# Patient Record
Sex: MALE | Race: WHITE | NOT HISPANIC OR LATINO | Employment: UNEMPLOYED | URBAN - METROPOLITAN AREA
[De-identification: names, ages, dates, MRNs, and addresses within clinical notes are randomized per-mention and may not be internally consistent; named-entity substitution may affect disease eponyms.]

---

## 2017-01-30 ENCOUNTER — HOSPITAL ENCOUNTER (EMERGENCY)
Facility: HOSPITAL | Age: 22
Discharge: HOME | End: 2017-01-30
Attending: EMERGENCY MEDICINE | Admitting: EMERGENCY MEDICINE
Payer: COMMERCIAL

## 2017-01-30 VITALS
HEART RATE: 55 BPM | OXYGEN SATURATION: 98 % | SYSTOLIC BLOOD PRESSURE: 158 MMHG | DIASTOLIC BLOOD PRESSURE: 74 MMHG | TEMPERATURE: 98.4 F | WEIGHT: 165 LBS | RESPIRATION RATE: 14 BRPM

## 2017-01-30 DIAGNOSIS — S06.0X9A CONCUSSION: Primary | ICD-10-CM

## 2017-01-30 PROCEDURE — 99283 EMERGENCY DEPT VISIT LOW MDM: CPT

## 2017-10-29 ENCOUNTER — HOSPITAL ENCOUNTER (EMERGENCY)
Facility: HOSPITAL | Age: 22
Discharge: HOME | End: 2017-10-30
Admitting: EMERGENCY MEDICINE
Payer: COMMERCIAL

## 2017-10-29 VITALS
OXYGEN SATURATION: 99 % | DIASTOLIC BLOOD PRESSURE: 86 MMHG | WEIGHT: 167.77 LBS | TEMPERATURE: 97.7 F | HEART RATE: 57 BPM | SYSTOLIC BLOOD PRESSURE: 163 MMHG | RESPIRATION RATE: 14 BRPM

## 2017-10-29 DIAGNOSIS — L24.9 IRRITANT CONTACT DERMATITIS, UNSPECIFIED TRIGGER: Primary | ICD-10-CM

## 2017-10-29 RX ORDER — DIAPER,BRIEF,INFANT-TODD,DISP
EACH MISCELLANEOUS 2 TIMES DAILY PRN
Status: DISCONTINUED | OUTPATIENT
Start: 2017-10-29 | End: 2017-10-30 | Stop reason: HOSPADM

## 2017-10-30 PROCEDURE — 99282 EMERGENCY DEPT VISIT SF MDM: CPT

## 2017-10-30 RX ADMIN — HYDROCORTISONE 1 APPLICATION: 1 CREAM TOPICAL at 00:22

## 2017-10-30 NOTE — ED PROVIDER NOTES
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 [place]  He states he was out jogging and dropped his keys and was looking for them in the weeds  He states that he then noticed that his hands were red, blistered and itching  He states he "freaked out" and came to the ED  He denies any known allergies  He states that his friends applied benadryl and the itching went away  Pt states he has anxiety and wanted the rash checked  History provided by:  Patient  Rash   Location:  Hand  Hand rash location:  L hand and R hand  Quality: blistering and itchiness    Severity:  Moderate  Onset quality:  Sudden  Progression:  Worsening  Chronicity:  New  Context: plant contact    Relieved by: Antihistamines  Worsened by:  Nothing      None       Past Medical History:   Diagnosis Date    Concussion        History reviewed  No pertinent surgical history  History reviewed  No pertinent family history  I have reviewed and agree with the history as documented  Social History   Substance Use Topics    Smoking status: Never Smoker    Smokeless tobacco: Never Used    Alcohol use No        Review of Systems   Constitutional: Negative  HENT: Negative  Eyes: Negative  Respiratory: Negative  Cardiovascular: Negative  Gastrointestinal: Negative  Endocrine: Negative  Genitourinary: Negative  Musculoskeletal: Negative  Skin: Positive for rash  Allergic/Immunologic: Negative  Neurological: Negative  Hematological: Negative  Psychiatric/Behavioral: Negative          Physical Exam  ED Triage Vitals [10/29/17 2341]   Temperature Pulse Respirations Blood Pressure SpO2   97 7 °F (36 5 °C) 57 14 163/86 99 %      Temp Source Heart Rate Source Patient Position - Orthostatic VS BP Location FiO2 (%)   Oral Monitor Sitting Right arm --      Pain Score       No Pain Orthostatic Vital Signs  Vitals:    10/29/17 2341   BP: 163/86   Pulse: 57   Patient Position - Orthostatic VS: Sitting       Physical Exam   Constitutional: He is oriented to person, place, and time  He appears well-developed and well-nourished  No distress  + anxiety    HENT:   Head: Normocephalic and atraumatic  Eyes: EOM are normal  Pupils are equal, round, and reactive to light  Neck: Normal range of motion  Neck supple  Cardiovascular: Normal rate, regular rhythm and normal heart sounds  Pulmonary/Chest: Effort normal and breath sounds normal    Abdominal: Soft  Bowel sounds are normal    Musculoskeletal: Normal range of motion  Neurological: He is alert and oriented to person, place, and time  Skin: Skin is warm  Capillary refill takes less than 2 seconds  He is not diaphoretic  Bilateral hands palmar and dorsal surface reveal macular red lesions  No blistering or open draining lesions noted  There are some linear lesions but pt states "his dog scratched him there"  Psychiatric: He has a normal mood and affect  His behavior is normal  Judgment and thought content normal    Nursing note and vitals reviewed        ED Medications  Medications   hydrocortisone 1 % cream (not administered)       Diagnostic Studies  Results Reviewed     None                 No orders to display              Procedures  Procedures       Phone Contacts  ED Phone Contact    ED Course  ED Course                                MDM  Number of Diagnoses or Management Options  Irritant contact dermatitis, unspecified trigger:   Diagnosis management comments: Contact dermatitis      Hydrocortisone cream 1%    CritCare Time    Disposition  Final diagnoses:   Irritant contact dermatitis, unspecified trigger     Time reflects when diagnosis was documented in both MDM as applicable and the Disposition within this note     Time User Action Codes Description Comment    10/29/2017 11:51 PM Sondra Shea Add [L24 9] Irritant contact dermatitis, unspecified trigger       ED Disposition     ED Disposition Condition Comment    Discharge  [facility] discharge to home/self care  Condition at discharge: Good        Follow-up Information     Follow up With Specialties Details Why Contact Info Additional Information    [address]  Schedule an appointment as soon as possible for a visit in 2 days  [address] [facility] Emergency Department Emergency Medicine  If symptoms worsen [address] [facility] ED, [address] [address]  , [facility], [place], [address]        Patient's Medications    No medications on file     No discharge procedures on file      ED Provider  Electronically Signed by           Ziyad Garcia  10/29/17 3515

## 2017-10-30 NOTE — DISCHARGE INSTRUCTIONS
You have been prescribed hydrocortisone cream to apply your hands when itching  You are to monitor what makes the rash worse or causes it to happen again  You may take benadryl orally if needed  Follow up with your PCP      Contact Dermatitis   WHAT YOU NEED TO KNOW:   Contact dermatitis is a skin rash  It develops when you touch something that irritates your skin or causes an allergic reaction  DISCHARGE INSTRUCTIONS:   Call 911 for any of the following:   · You have sudden trouble breathing  · Your throat swells and you have trouble eating  · Your face is swollen  Contact your healthcare provider if:   · You have a fever  · Your blisters are draining pus  · Your rash spreads or does not get better, even after treatment  · You have questions or concerns about your condition or care  Medicines:   · Medicines  help decrease itching and swelling  They will be given as a topical medicine to apply to your rash or as a pill  · Take your medicine as directed  Contact your healthcare provider if you think your medicine is not helping or if you have side effects  Tell him or her if you are allergic to any medicine  Keep a list of the medicines, vitamins, and herbs you take  Include the amounts, and when and why you take them  Bring the list or the pill bottles to follow-up visits  Carry your medicine list with you in case of an emergency  Manage contact dermatitis:   · Take short baths or showers in cool water  Use mild soap or soap-free cleansers  Add oatmeal, baking soda, or cornstarch to the bath water to help decrease skin irritation  · Avoid skin irritants , such as makeup, hair products, soaps, and cleansers  Use products that do not contain perfume or dye  · Apply a cool compress to your rash  This will help soothe your skin  · Keep your skin moist   Rub unscented cream or lotion on your skin to prevent dryness and itching   Do this right after a bath or shower when your skin is still damp  Follow up with your healthcare provider or dermatologist in 2 to 3 days:  Write down your questions so you remember to ask them during your visits  © 2017 [address] [employer] Information is for End User's use only and may not be sold, redistributed or otherwise used for commercial purposes  All illustrations and images included in CareNotes® are the copyrighted property of [employer]  or [employer]  The above information is an  only  It is not intended as medical advice for individual conditions or treatments  Talk to your doctor, nurse or pharmacist before following any medical regimen to see if it is safe and effective for you